# Patient Record
Sex: FEMALE | Race: WHITE | NOT HISPANIC OR LATINO | Employment: FULL TIME | URBAN - METROPOLITAN AREA
[De-identification: names, ages, dates, MRNs, and addresses within clinical notes are randomized per-mention and may not be internally consistent; named-entity substitution may affect disease eponyms.]

---

## 2017-08-08 ENCOUNTER — OFFICE VISIT (OUTPATIENT)
Dept: FAMILY MEDICINE | Facility: CLINIC | Age: 62
End: 2017-08-08
Payer: COMMERCIAL

## 2017-08-08 ENCOUNTER — HOSPITAL ENCOUNTER (OUTPATIENT)
Dept: RADIOLOGY | Facility: HOSPITAL | Age: 62
Discharge: HOME | End: 2017-08-08
Attending: INTERNAL MEDICINE
Payer: COMMERCIAL

## 2017-08-08 VITALS
HEIGHT: 65 IN | SYSTOLIC BLOOD PRESSURE: 106 MMHG | WEIGHT: 155.31 LBS | DIASTOLIC BLOOD PRESSURE: 70 MMHG | HEART RATE: 68 BPM | BODY MASS INDEX: 25.88 KG/M2

## 2017-08-08 DIAGNOSIS — Z12.31 ENCOUNTER FOR SCREENING MAMMOGRAM FOR BREAST CANCER: ICD-10-CM

## 2017-08-08 DIAGNOSIS — Z00.00 ROUTINE MEDICAL EXAM: Primary | ICD-10-CM

## 2017-08-08 DIAGNOSIS — M25.612 DECREASED ROM OF LEFT SHOULDER: ICD-10-CM

## 2017-08-08 DIAGNOSIS — Z12.11 SCREENING FOR COLON CANCER: ICD-10-CM

## 2017-08-08 DIAGNOSIS — M25.551 PAIN OF BOTH HIP JOINTS: ICD-10-CM

## 2017-08-08 DIAGNOSIS — Z11.59 NEED FOR HEPATITIS C SCREENING TEST: ICD-10-CM

## 2017-08-08 DIAGNOSIS — M25.552 PAIN OF BOTH HIP JOINTS: ICD-10-CM

## 2017-08-08 DIAGNOSIS — E78.5 HYPERLIPIDEMIA, UNSPECIFIED HYPERLIPIDEMIA TYPE: ICD-10-CM

## 2017-08-08 PROCEDURE — 77067 SCR MAMMO BI INCL CAD: CPT | Mod: TC

## 2017-08-08 PROCEDURE — 73521 X-RAY EXAM HIPS BI 2 VIEWS: CPT | Mod: 26,,, | Performed by: RADIOLOGY

## 2017-08-08 PROCEDURE — 99999 PR PBB SHADOW E&M-NEW PATIENT-LVL III: CPT | Mod: PBBFAC,,, | Performed by: INTERNAL MEDICINE

## 2017-08-08 PROCEDURE — 73521 X-RAY EXAM HIPS BI 2 VIEWS: CPT | Mod: TC,PO

## 2017-08-08 PROCEDURE — 99386 PREV VISIT NEW AGE 40-64: CPT | Mod: S$GLB,,, | Performed by: INTERNAL MEDICINE

## 2017-08-08 PROCEDURE — 77067 SCR MAMMO BI INCL CAD: CPT | Mod: 26,,, | Performed by: RADIOLOGY

## 2017-08-08 RX ORDER — SIMVASTATIN 20 MG/1
20 TABLET, FILM COATED ORAL NIGHTLY
Qty: 90 TABLET | Refills: 3 | Status: SHIPPED | OUTPATIENT
Start: 2017-08-08

## 2017-08-08 RX ORDER — SIMVASTATIN 20 MG/1
20 TABLET, FILM COATED ORAL NIGHTLY
COMMUNITY
End: 2017-08-08 | Stop reason: SDUPTHER

## 2017-08-08 NOTE — PROGRESS NOTES
Your lab results are normal.  Please continue your current medications and doses.  Please feel free to contact me with any questions or concerns.    Sincerely,  Omar Billings  [email or web address]

## 2017-08-08 NOTE — PROGRESS NOTES
Your hip xray results are showing a fair amount of arthritis in both hips but the right is worse.  There was also an incidental finding of some low back arthritis.  I think we should proceed with the orthopedics referral to get established as we discussed.  The main thing I need to know if whether or not you prefer to stay on the [facility] or with [facility] [place].  Please feel free to contact me with any questions or concerns.    Sincerely,  Omar Billings  [email or web address]

## 2017-08-08 NOTE — PROGRESS NOTES
"Chief Complaint  Chief Complaint   Patient presents with    Annual Exam    Establish Care    Hip Pain     right       HPI  Negar Perez is a 62 y.o. female with multiple medical diagnoses as listed in the medical history and problem list that presents for establish care.  Pt is new to me and to this clinic.  She has been having right hip pain that has increased in the past month after working several nights in a row.  There was a possible injury after being thrown from a horse in her teens or 20s.  Takes aleve 1 tab BID with some decent control but it is getting worse.  Pain is worse with going down stairs and stepping up into a car etc.  Her hip feels "loose" to her.  Pain is in the inguinal line and worse with flexion of the hip.        PAST MEDICAL HISTORY:  Past Medical History:   Diagnosis Date    Hyperlipidemia     Traumatic closed displaced fracture of left shoulder with anterior dislocation     21 years old didn't need repair       PAST SURGICAL HISTORY:  Past Surgical History:   Procedure Laterality Date    BREAST SURGERY      implants removed    CARPAL TUNNEL RELEASE         SOCIAL HISTORY:  Social History     Social History    Marital status: Single     Spouse name: N/A    Number of children: N/A    Years of education: N/A     Occupational History    Not on file.     Social History Main Topics    Smoking status: Never Smoker    Smokeless tobacco: Never Used    Alcohol use Yes      Comment: once a month    Drug use: No    Sexual activity: No     Other Topics Concern    Not on file     Social History Narrative    No narrative on file       FAMILY HISTORY:  Family History   Problem Relation Age of Onset    Heart disease Mother      ablation to mitral valve    Hypertension Mother     Heart attack Paternal Grandfather        ALLERGIES AND MEDICATIONS: updated and reviewed.  Review of patient's allergies indicates:  Allergies not on file  Current Outpatient Prescriptions   Medication " "Sig Dispense Refill    simvastatin (ZOCOR) 20 MG tablet Take 1 tablet (20 mg total) by mouth every evening. 90 tablet 3     No current facility-administered medications for this visit.          ROS  Review of Systems   Constitutional: Negative for chills, diaphoresis, fever and unexpected weight change.   HENT: Negative for congestion, dental problem, ear discharge, ear pain, hearing loss, postnasal drip, rhinorrhea, sinus pressure, sore throat and trouble swallowing.    Eyes: Negative for pain, discharge, redness, itching and visual disturbance.   Respiratory: Negative for cough, chest tightness, shortness of breath and wheezing.    Cardiovascular: Negative for chest pain, palpitations and leg swelling.   Gastrointestinal: Negative for abdominal distention, abdominal pain, blood in stool, constipation, diarrhea, nausea and vomiting.        No melena   Endocrine: Negative for cold intolerance, heat intolerance, polydipsia, polyphagia and polyuria.        No nocturia   Genitourinary: Negative for decreased urine volume, difficulty urinating, dysuria, flank pain, frequency, genital sores, hematuria, menstrual problem, pelvic pain, urgency, vaginal bleeding, vaginal discharge and vaginal pain.   Musculoskeletal: Positive for arthralgias. Negative for back pain, joint swelling, myalgias, neck pain and neck stiffness.   Skin: Negative for color change, pallor and rash.        Irritated mole on back     Neurological: Positive for weakness (RLE with hip pain). Negative for dizziness, tremors, seizures, syncope, light-headedness and headaches.   Hematological: Negative for adenopathy. Does not bruise/bleed easily.   Psychiatric/Behavioral: Negative for confusion, decreased concentration, sleep disturbance and suicidal ideas. The patient is not nervous/anxious.         No depression         Physical Exam  Vitals:    08/08/17 0737   BP: 106/70   Pulse: 68   Weight: 70.5 kg (155 lb 5 oz)   Height: 5' 5" (1.651 m)    Body mass " "index is 25.85 kg/m².  Weight: 70.5 kg (155 lb 5 oz)   Height: 5' 5" (165.1 cm)   General appearance: alert, appears stated age, cooperative and no distress  Head: Normocephalic, without obvious abnormality, atraumatic  Eyes: PERRL EOMI conjunctiva clear  Ears: normal TM's and external ear canals both ears  Nose: Nares normal. Septum midline. Mucosa normal. No drainage or sinus tenderness.  Throat: lips, mucosa, and tongue normal; teeth and gums normal  Neck: no adenopathy, no carotid bruit, no JVD, supple, symmetrical, trachea midline and thyroid not enlarged, symmetric, no tenderness/mass/nodules  Back: symmetric, no curvature. ROM normal. No CVA tenderness.  Lungs: clear to auscultation bilaterally  Heart: regular rate and rhythm, S1, S2 normal, no murmur, click, rub or gallop  Abdomen: soft, non-tender; bowel sounds normal; no masses,  no organomegaly  Extremities: extremities normal, atraumatic, no cyanosis or edema  Pulses: 2+ and symmetric  Skin: Skin color, texture, turgor normal. No rashes or lesions or skin colored nevus on the mid-back with excoriation present  Neurologic: Mental status: Alert, oriented, thought content appropriate  Sensory: normal  Motor:grossly normal  Coordination: normal  Gait: Normal  Symmetric facial movements palate elevated symmetrically tongue midline   Bilateral hips:  No effusion erythema warmth or TTP of the greater trochanter.  FROM without pain.    Bilateral knees:  No effusion erythema warmth or TTP.  FROM without pain.  No MCL LCL or ACL laxity.   Right shoulder: no warmth, erythema, or TTP of clavicle, AC joint, lateral deltoid.  No TTP of biceps tendon.  FROM without pain.  Negative radial arc and drop arm.  No pain with cross-body AC joint loading.  Negative shoulder apprehension test.  Left shoulder: no warmth, erythema, or TTP of clavicle, AC joint, lateral deltoid.  No TTP of biceps tendon.  FROM without pain.  Negative radial arc and drop arm.  No pain with " cross-body AC joint loading.  Negative shoulder apprehension test but decreased ROM with this compared to the right without guarding      Health Maintenance    Patient has no pending health maintenance at this time           Assessment & Plan  Problem List Items Addressed This Visit        Cardiac/Vascular    Hyperlipidemia    Current Assessment & Plan     On statin.  Recheck labs.           Relevant Medications    simvastatin (ZOCOR) 20 MG tablet      Other Visit Diagnoses     Routine medical exam    -  Primary  -    Discussed healthy diet, regular exercise, necessary labs, age appropriate cancer screening, and routine vaccinations.   Requesting old PCP records.     Relevant Orders    CBC auto differential    Comprehensive metabolic panel    Lipid panel    TSH    Encounter for screening mammogram for breast cancer    -  Screening MMG today    Relevant Orders    Mammo Digital Screening Bilat with CAD    Screening for colon cancer    -  Counseled on C-scope versus stool card screening.  Will proceed with yearly stool card screenings    Relevant Orders    Occult blood x 1, stool    Occult blood x 1, stool    Occult blood x 1, stool    Need for hepatitis C screening test    -  Screen per CDC guidelines.     Relevant Orders    Hepatitis C antibody    Pain of both hip joints    -  Check plain film as there is pain on internal rotation.  NSAIDs ok.  Will discuss PT versus ortho referral pending results.     Relevant Orders    X-Ray Hips Bilateral 2 View Inc AP Pelvis    Decreased ROM of left shoulder    -  Counseled on use of NSAIDs and role of PT.  She would like to try home PT first.  Provided home exercise sheet.             Health Maintenance reviewed, awaiting outside records.  Pap reportedly last summer.    Follow-up: Return in about 1 year (around 8/8/2018) for Routine Physical.

## 2017-08-08 NOTE — PATIENT INSTRUCTIONS
I will be in touch with your results.  It is ok to use the ibuprofen and naproxen as needed.     You may take over the counter ibuprofen 400mg (2 tabs) to 800mg (4 tabs) up to every 6 hours as needed for pain/inflammation OR naproxen 220mg (1 tab) to 440mg (2 tabs) every 12 hours as needed for pain/inflammation.  If you are taking the higher dosages, take with some food.  Also, avoid taking the higher dosage of ibuprofren every 6 or naproxen every 12 hours for more than 48-72 hours.  If you start to have abdominal pain, dark stools, or vomiting after doses, stop taking this medication immediately and contact a physician.  Do not take any other NSAIDs (non-steroidal anti-inflammatory drugs) in addition to your ibuprofen.  If you are unsure if a medication is an NSAID you may ask your pharmacist or call the office.

## 2017-08-09 ENCOUNTER — PATIENT MESSAGE (OUTPATIENT)
Dept: FAMILY MEDICINE | Facility: CLINIC | Age: 62
End: 2017-08-09

## 2017-08-09 DIAGNOSIS — M16.0 PRIMARY OSTEOARTHRITIS OF BOTH HIPS: ICD-10-CM

## 2017-08-10 PROBLEM — M16.0 PRIMARY OSTEOARTHRITIS OF BOTH HIPS: Status: ACTIVE | Noted: 2017-08-10

## 2017-08-23 DIAGNOSIS — M16.9 DEGENERATIVE JOINT DISEASE (DJD) OF HIP: Primary | ICD-10-CM

## 2017-08-25 DIAGNOSIS — Z12.11 COLON CANCER SCREENING: ICD-10-CM

## 2018-08-31 DIAGNOSIS — Z12.11 COLON CANCER SCREENING: ICD-10-CM
